# Patient Record
Sex: FEMALE | Race: WHITE
[De-identification: names, ages, dates, MRNs, and addresses within clinical notes are randomized per-mention and may not be internally consistent; named-entity substitution may affect disease eponyms.]

---

## 2022-05-12 ENCOUNTER — HOSPITAL ENCOUNTER (INPATIENT)
Dept: HOSPITAL 12 - ER | Age: 67
LOS: 1 days | Discharge: HOME | DRG: 339 | End: 2022-05-13
Payer: COMMERCIAL

## 2022-05-12 VITALS — DIASTOLIC BLOOD PRESSURE: 55 MMHG | SYSTOLIC BLOOD PRESSURE: 103 MMHG

## 2022-05-12 VITALS — SYSTOLIC BLOOD PRESSURE: 113 MMHG | DIASTOLIC BLOOD PRESSURE: 62 MMHG

## 2022-05-12 VITALS — BODY MASS INDEX: 24.55 KG/M2 | HEIGHT: 65 IN | WEIGHT: 147.37 LBS

## 2022-05-12 VITALS — SYSTOLIC BLOOD PRESSURE: 103 MMHG | DIASTOLIC BLOOD PRESSURE: 52 MMHG

## 2022-05-12 DIAGNOSIS — Z20.822: ICD-10-CM

## 2022-05-12 DIAGNOSIS — D72.829: ICD-10-CM

## 2022-05-12 DIAGNOSIS — M75.01: ICD-10-CM

## 2022-05-12 DIAGNOSIS — E87.1: ICD-10-CM

## 2022-05-12 DIAGNOSIS — K35.32: Primary | ICD-10-CM

## 2022-05-12 DIAGNOSIS — I48.91: ICD-10-CM

## 2022-05-12 DIAGNOSIS — M81.0: ICD-10-CM

## 2022-05-12 LAB
ALP SERPL-CCNC: 82 U/L (ref 50–136)
ALT SERPL W/O P-5'-P-CCNC: 23 U/L (ref 14–59)
APPEARANCE UR: CLEAR
AST SERPL-CCNC: 15 U/L (ref 15–37)
BILIRUB DIRECT SERPL-MCNC: 0.2 MG/DL (ref 0–0.2)
BILIRUB SERPL-MCNC: 0.8 MG/DL (ref 0.2–1)
BILIRUB UR QL STRIP: NEGATIVE
BUN SERPL-MCNC: 18 MG/DL (ref 7–18)
CHLORIDE SERPL-SCNC: 97 MMOL/L (ref 98–107)
CO2 SERPL-SCNC: 23 MMOL/L (ref 21–32)
COLOR UR: YELLOW
CREAT SERPL-MCNC: 0.8 MG/DL (ref 0.6–1.3)
GLUCOSE SERPL-MCNC: 168 MG/DL (ref 74–106)
GLUCOSE UR STRIP-MCNC: NEGATIVE MG/DL
HCT VFR BLD AUTO: 37.7 % (ref 31.2–41.9)
HGB UR QL STRIP: NEGATIVE
KETONES UR STRIP-MCNC: (no result) MG/DL
LEUKOCYTE ESTERASE UR QL STRIP: NEGATIVE
LIPASE SERPL-CCNC: 137 U/L (ref 73–393)
MCH RBC QN AUTO: 31.1 UUG (ref 24.7–32.8)
MCV RBC AUTO: 89.8 FL (ref 75.5–95.3)
NITRITE UR QL STRIP: NEGATIVE
PH UR STRIP: 7.5 [PH] (ref 5–8)
PLATELET # BLD AUTO: 271 K/UL (ref 179–408)
POTASSIUM SERPL-SCNC: 3.5 MMOL/L (ref 3.5–5.1)
SP GR UR STRIP: 1.01 (ref 1–1.03)
UROBILINOGEN UR STRIP-MCNC: 0.2 E.U./DL
WS STN SPEC: 7.2 G/DL (ref 6.4–8.2)

## 2022-05-12 PROCEDURE — 0DTJ4ZZ RESECTION OF APPENDIX, PERCUTANEOUS ENDOSCOPIC APPROACH: ICD-10-PCS

## 2022-05-12 PROCEDURE — G0378 HOSPITAL OBSERVATION PER HR: HCPCS

## 2022-05-12 PROCEDURE — A4663 DIALYSIS BLOOD PRESSURE CUFF: HCPCS

## 2022-05-12 PROCEDURE — C9113 INJ PANTOPRAZOLE SODIUM, VIA: HCPCS

## 2022-05-12 RX ADMIN — DEXTROSE AND SODIUM CHLORIDE PRN MLS/HR: 5; .45 INJECTION, SOLUTION INTRAVENOUS at 10:01

## 2022-05-12 RX ADMIN — METRONIDAZOLE SCH MLS/HR: 500 SOLUTION INTRAVENOUS at 21:55

## 2022-05-12 RX ADMIN — METRONIDAZOLE SCH MLS/HR: 500 SOLUTION INTRAVENOUS at 13:25

## 2022-05-12 RX ADMIN — Medication PRN MG: at 07:48

## 2022-05-12 RX ADMIN — DEXTROSE SCH MG: 50 INJECTION, SOLUTION INTRAVENOUS at 10:00

## 2022-05-12 NOTE — NUR
RECEIVED FROM RR. AWAKE, ALERT AND ORIENTED. DENIES PAIN OR DISCOMFORT. CLEAR LIQUID DIET. 
FAMILY AT BEDSIDE.

## 2022-05-12 NOTE — NUR
Received patient in bed AALOx4.Denies pain at this time.No  N/V.S/p lap appendectomy 
.Surgical sites x3 with steri strips in place. Clean and dry .No active bleeding. Patient 
ambulates to bathroom.Voided well.Incentive spirometer provided . Instruction given and able 
to return demo.IV patent and intact on left AC 20g with IVF running well. Call light with in 
reach.Will continue to monitor.

## 2022-05-12 NOTE — NUR
STAB WOUNDS X3 CLEAN, DRY & INTACT. NO C/O DISCOMFORT. IS GIVEN TO PATIENT, AND INSTRUCTED 
HOW TO USE IT. VERBALIZED UNDERSTANDING.

## 2022-05-13 VITALS — DIASTOLIC BLOOD PRESSURE: 54 MMHG | SYSTOLIC BLOOD PRESSURE: 98 MMHG

## 2022-05-13 LAB
BUN SERPL-MCNC: 10 MG/DL (ref 7–18)
CHLORIDE SERPL-SCNC: 102 MMOL/L (ref 98–107)
CO2 SERPL-SCNC: 27 MMOL/L (ref 21–32)
CREAT SERPL-MCNC: 0.7 MG/DL (ref 0.6–1.3)
GLUCOSE SERPL-MCNC: 113 MG/DL (ref 74–106)
HCT VFR BLD AUTO: 32.2 % (ref 31.2–41.9)
MAGNESIUM SERPL-MCNC: 1.8 MG/DL (ref 1.8–2.4)
MCH RBC QN AUTO: 31.9 UUG (ref 24.7–32.8)
MCV RBC AUTO: 91.5 FL (ref 75.5–95.3)
PHOSPHATE SERPL-MCNC: 3.2 MG/DL (ref 2.5–4.9)
PLATELET # BLD AUTO: 157 K/UL (ref 179–408)
POTASSIUM SERPL-SCNC: 4.1 MMOL/L (ref 3.5–5.1)
TSH SERPL DL<=0.005 MIU/L-ACNC: 1.59 MIU/ML (ref 0.36–3.74)

## 2022-05-13 RX ADMIN — METRONIDAZOLE SCH MLS/HR: 500 SOLUTION INTRAVENOUS at 05:53

## 2022-05-13 RX ADMIN — DEXTROSE SCH MG: 50 INJECTION, SOLUTION INTRAVENOUS at 08:56

## 2022-05-13 RX ADMIN — Medication PRN MG: at 06:10

## 2022-05-13 RX ADMIN — DEXTROSE AND SODIUM CHLORIDE PRN MLS/HR: 5; .45 INJECTION, SOLUTION INTRAVENOUS at 05:53

## 2022-05-13 NOTE — NUR
Received patient in bed awake alert and oriented times 4.  No sign of distress noted at this 
time. No complaints of pain. Safety precautions are in place. Will continue to monitor.

## 2022-05-13 NOTE — NUR
Dr Andrew mendoza DC.  Made him aware pt has not had a bowel movement.  States pt has not been 
eating for 3 days.

## 2022-05-13 NOTE — NUR
Patient discharge to home with all belongings. All paperwork including belongings forms are 
signed. No distress noted, pt is stable.  IV and ID band removed.  All medications given as 
ordered. Patient left with  in a private vehicle.  Pt teachings completed.